# Patient Record
Sex: FEMALE | Race: OTHER | HISPANIC OR LATINO | Employment: OTHER | URBAN - METROPOLITAN AREA
[De-identification: names, ages, dates, MRNs, and addresses within clinical notes are randomized per-mention and may not be internally consistent; named-entity substitution may affect disease eponyms.]

---

## 2019-06-06 ENCOUNTER — HOSPITAL ENCOUNTER (EMERGENCY)
Facility: HOSPITAL | Age: 84
Discharge: HOME | End: 2019-06-06
Attending: INTERNAL MEDICINE
Payer: MEDICAID

## 2019-06-06 DIAGNOSIS — S00.552A: Primary | ICD-10-CM

## 2019-06-06 LAB — POCT GLUCOSE: 253 MG/DL (ref 70–110)

## 2019-06-06 PROCEDURE — 99283 EMERGENCY DEPT VISIT LOW MDM: CPT | Mod: ER

## 2019-06-07 VITALS
HEART RATE: 81 BPM | SYSTOLIC BLOOD PRESSURE: 154 MMHG | BODY MASS INDEX: 35.34 KG/M2 | DIASTOLIC BLOOD PRESSURE: 78 MMHG | RESPIRATION RATE: 18 BRPM | WEIGHT: 180 LBS | TEMPERATURE: 99 F | OXYGEN SATURATION: 99 % | HEIGHT: 60 IN

## 2019-06-07 NOTE — ED PROVIDER NOTES
Encounter Date: 6/6/2019    SCRIBE #1 NOTE: I, Mariola Louis, am scribing for, and in the presence of,  Dr. Bliss. I have scribed the following portions of the note - Other sections scribed: HPI, ROS, PE.       History     Chief Complaint   Patient presents with    Dental Problem     Patient was eating chicken about 6:30 pm without dentures and piece of chicken bone stuck in top gum line     This is a 83 year old female complaining of top gum line pain beginning earlier this evening. Patient was eating chicken at apprx 1830 today without dentures when the bone got stuck.    The history is provided by the patient. No  was used.     Review of patient's allergies indicates:  Allergies no known allergies  No past medical history on file.  No past surgical history on file.  No family history on file.  Social History     Tobacco Use    Smoking status: Not on file   Substance Use Topics    Alcohol use: Not on file    Drug use: Not on file     Review of Systems   Constitutional: Negative for fever.   HENT: Positive for dental problem. Negative for facial swelling.    Eyes: Negative for redness.   Respiratory: Negative for shortness of breath.    Gastrointestinal: Negative for vomiting.   Skin: Negative for rash and wound.   All other systems reviewed and are negative.      Physical Exam     Initial Vitals [06/06/19 2129]   BP Pulse Resp Temp SpO2   (!) 166/75 87 20 98.6 °F (37 °C) --      MAP       --         Physical Exam    Nursing note and vitals reviewed.  Constitutional: She appears well-developed and well-nourished.   HENT:   Head: Normocephalic and atraumatic.   Right Ear: External ear normal.   Left Ear: External ear normal.   Mouth/Throat: Mucous membranes are normal. No oropharyngeal exudate.   Whitish colored foreign body in the upper left incisor region of the gingiva   Eyes: Conjunctivae and EOM are normal. Pupils are equal, round, and reactive to light.   Neck: Normal range of  motion. Neck supple.   Cardiovascular: Normal rate, regular rhythm, normal heart sounds and intact distal pulses. Exam reveals no gallop and no friction rub.    No murmur heard.  Pulmonary/Chest: Breath sounds normal. No stridor. No respiratory distress. She has no wheezes.   Abdominal: Soft.   Musculoskeletal: Normal range of motion.   Neurological: She is alert and oriented to person, place, and time. She has normal strength. No cranial nerve deficit or sensory deficit. GCS score is 15. GCS eye subscore is 4. GCS verbal subscore is 5. GCS motor subscore is 6.   Skin: Skin is warm and dry.   Psychiatric: She has a normal mood and affect. Her behavior is normal.         ED Course   Foreign Body  Date/Time: 6/6/2019 9:40 PM  Performed by: Oskar Bliss MD  Authorized by: Oskar Bliss MD   Consent Done: Yes  Consent: Verbal consent obtained.  Risks and benefits: risks, benefits and alternatives were discussed  Consent given by: patient  Patient understanding: patient states understanding of the procedure being performed  Patient consent: the patient's understanding of the procedure matches consent given  Procedure consent: procedure consent matches procedure scheduled  Test results: test results available and properly labeled  Patient identity confirmed: verbally with patient  Intake: Gum.  Patient sedated: no  Patient restrained: no  Patient cooperative: yes  1 objects recovered.  Post-procedure assessment: foreign body removed  Patient tolerance: Patient tolerated the procedure well with no immediate complications  Comments: Foreign body successfully removed with approximately .5 ccs of blood loss      Labs Reviewed   POCT GLUCOSE - Abnormal; Notable for the following components:       Result Value    POCT Glucose 253 (*)     All other components within normal limits   POCT GLUCOSE, HAND-HELD DEVICE          Imaging Results    None          Medical Decision Making:   Initial Assessment:   This is a 83 year  old female complaining of top gum line pain beginning earlier this evening. Patient was eating chicken at apprx 1830 today without dentures when the bone got stuck.  ED Management:  Foreign body was removed without difficulty and patient tolerated the procedure well.  Instructions for foreign body were given the patient was advised to follow up with her primary care physician within the next week for re-evaluation and to return to the emergency department if condition worsens.            Scribe Attestation:   Scribe #1: I performed the above scribed service and the documentation accurately describes the services I performed. I attest to the accuracy of the note.    This document was produced by a scribe under my direction and in my presence. I agree with the content of the note and have made any necessary edits.     Dr. Bliss    06/06/2019 9:46 PM          Clinical Impression:     1. Foreign body in gingiva, initial encounter            Disposition:   Disposition: Discharged  Condition: Stable                        Oskar Bliss MD  06/06/19 9641